# Patient Record
Sex: FEMALE | Race: WHITE | NOT HISPANIC OR LATINO | Employment: FULL TIME | URBAN - METROPOLITAN AREA
[De-identification: names, ages, dates, MRNs, and addresses within clinical notes are randomized per-mention and may not be internally consistent; named-entity substitution may affect disease eponyms.]

---

## 2024-11-06 ENCOUNTER — OFFICE VISIT (OUTPATIENT)
Dept: URGENT CARE | Facility: CLINIC | Age: 44
End: 2024-11-06
Payer: OTHER MISCELLANEOUS

## 2024-11-06 VITALS
BODY MASS INDEX: 26.43 KG/M2 | HEART RATE: 101 BPM | HEIGHT: 61 IN | DIASTOLIC BLOOD PRESSURE: 82 MMHG | WEIGHT: 140 LBS | TEMPERATURE: 98 F | OXYGEN SATURATION: 98 % | RESPIRATION RATE: 17 BRPM | SYSTOLIC BLOOD PRESSURE: 132 MMHG

## 2024-11-06 DIAGNOSIS — Z77.21 EXPOSURE TO BLOOD-BORNE PATHOGEN: Primary | ICD-10-CM

## 2024-11-06 PROCEDURE — 87389 HIV-1 AG W/HIV-1&-2 AB AG IA: CPT | Mod: QW,S$GLB,, | Performed by: FAMILY MEDICINE

## 2024-11-06 PROCEDURE — 80074 ACUTE HEPATITIS PANEL: CPT | Mod: S$GLB,,, | Performed by: FAMILY MEDICINE

## 2024-11-06 NOTE — LETTER
[facility] [place] [address], [address]  [facility] [address]  Phone: [phone]  Fax: [phone]  [facility]: [email or web address]    Pt Name: Danya Diaz Date: 10/29/2024   Employee ID: [id number] Date of First Treatment: 11/06/2024   Company: [employer]      Appointment Time: 10:35 AM Arrived: 1054 am   Provider: Nelson Rey MD Time Out:1140 am     Office Treatment:   1. Exposure to blood-borne pathogen       Restrictions: Regular Duty              Return Appointment: per protocol

## 2024-11-06 NOTE — PROGRESS NOTES
Subjective:      Patient ID: Danya Kemp is a 44 y.o. female.    Chief Complaint: Body Fluid Exposure    Employer: [employer]  Job Title: Tissue Recovery Tech  Date of Injury: 10/29/2024  Body Part: Rt Index Finger  Injury Mechanism: Needlestick  What were you doing when you got injured? Patient was procuring organ recovering tissue when she accidentally stuck herself right index finger with needle. Patient did not notice however until next day she felt bump and slight pain where needle had pricked her.   What did you do immediately after injury? She completed her shift.  Pain Scale right now: 1      ROS  Objective:     Physical Exam   Right distal index finger.  No signs of infection  Lump felt over area of needle stick- appears to be opaque pinpoint appearance at center of the needle stick- discussed that could be small blood products vs foreign material  Assessment:      1. Exposure to blood-borne pathogen      Plan:   Advised wait and see for the area near needle stick.   Return per protocol              No follow-ups on file.

## 2024-11-07 LAB
HAV IGM SERPL QL IA: NEGATIVE
HBV CORE IGM SERPL QL IA: NEGATIVE
HBV SURFACE AG SERPL QL IA: NEGATIVE
HCV AB SERPL QL IA: NORMAL
HCV IGG SERPL QL IA: NON REACTIVE
HIV 1+2 AB+HIV1 P24 AG SERPL QL IA: NON REACTIVE
RPR SER QL: NON REACTIVE

## 2024-11-08 ENCOUNTER — OCCUPATIONAL HEALTH (OUTPATIENT)
Dept: URGENT CARE | Facility: CLINIC | Age: 44
End: 2024-11-08

## 2024-11-12 LAB — HBV SURFACE AB SER QL: NON REACTIVE

## 2024-11-14 ENCOUNTER — OCCUPATIONAL HEALTH (OUTPATIENT)
Dept: URGENT CARE | Facility: CLINIC | Age: 44
End: 2024-11-14

## 2024-11-14 DIAGNOSIS — Z23 ENCOUNTER FOR IMMUNIZATION: Primary | ICD-10-CM

## 2024-12-20 ENCOUNTER — OCCUPATIONAL HEALTH (OUTPATIENT)
Dept: URGENT CARE | Facility: CLINIC | Age: 44
End: 2024-12-20
Payer: OTHER MISCELLANEOUS

## 2024-12-20 DIAGNOSIS — Z77.21 EXPOSURE TO BLOOD-BORNE PATHOGEN: Primary | ICD-10-CM

## 2025-02-04 ENCOUNTER — TELEPHONE (OUTPATIENT)
Dept: URGENT CARE | Facility: CLINIC | Age: 45
End: 2025-02-04
Payer: MEDICAID
